# Patient Record
Sex: MALE | URBAN - METROPOLITAN AREA
[De-identification: names, ages, dates, MRNs, and addresses within clinical notes are randomized per-mention and may not be internally consistent; named-entity substitution may affect disease eponyms.]

---

## 2020-03-07 ENCOUNTER — HOSPITAL ENCOUNTER (EMERGENCY)
Age: 30
Discharge: HOME | End: 2020-03-07
Attending: EMERGENCY MEDICINE

## 2020-03-07 ENCOUNTER — APPOINTMENT (OUTPATIENT)
Dept: GENERAL RADIOLOGY | Age: 30
End: 2020-03-07
Attending: EMERGENCY MEDICINE

## 2020-03-07 VITALS
BODY MASS INDEX: 38.32 KG/M2 | DIASTOLIC BLOOD PRESSURE: 87 MMHG | OXYGEN SATURATION: 100 % | HEART RATE: 53 BPM | WEIGHT: 230 LBS | TEMPERATURE: 97 F | HEIGHT: 65 IN | SYSTOLIC BLOOD PRESSURE: 141 MMHG | RESPIRATION RATE: 14 BRPM

## 2020-03-07 DIAGNOSIS — S61.412A LACERATION OF LEFT HAND WITHOUT FOREIGN BODY, INITIAL ENCOUNTER: ICD-10-CM

## 2020-03-07 DIAGNOSIS — S61.412A LACERATION OF LEFT HAND, FOREIGN BODY PRESENCE UNSPECIFIED, INITIAL ENCOUNTER: Primary | ICD-10-CM

## 2020-03-07 PROCEDURE — 99284 EMERGENCY DEPT VISIT MOD MDM: CPT | Performed by: EMERGENCY MEDICINE

## 2020-03-07 PROCEDURE — 73130 X-RAY EXAM OF HAND: CPT

## 2020-03-07 PROCEDURE — 10002800 HB RX 250 W HCPCS: Performed by: EMERGENCY MEDICINE

## 2020-03-07 PROCEDURE — 12001 RPR S/N/AX/GEN/TRNK 2.5CM/<: CPT | Performed by: EMERGENCY MEDICINE

## 2020-03-07 PROCEDURE — 99283 EMERGENCY DEPT VISIT LOW MDM: CPT

## 2020-03-07 PROCEDURE — 96374 THER/PROPH/DIAG INJ IV PUSH: CPT

## 2020-03-07 PROCEDURE — 12001 RPR S/N/AX/GEN/TRNK 2.5CM/<: CPT

## 2020-03-07 RX ORDER — CEPHALEXIN 500 MG/1
500 CAPSULE ORAL 3 TIMES DAILY
Qty: 15 CAPSULE | Refills: 0 | Status: SHIPPED | OUTPATIENT
Start: 2020-03-07 | End: 2020-03-12

## 2020-03-07 RX ADMIN — CEFAZOLIN SODIUM 2000 MG: 300 INJECTION, POWDER, LYOPHILIZED, FOR SOLUTION INTRAVENOUS at 19:33

## 2020-03-07 ASSESSMENT — PAIN SCALES - GENERAL: PAINLEVEL_OUTOF10: 0

## 2024-09-15 ENCOUNTER — APPOINTMENT (OUTPATIENT)
Dept: GENERAL RADIOLOGY | Facility: HOSPITAL | Age: 34
End: 2024-09-15
Attending: EMERGENCY MEDICINE
Payer: COMMERCIAL

## 2024-09-15 ENCOUNTER — HOSPITAL ENCOUNTER (EMERGENCY)
Facility: HOSPITAL | Age: 34
Discharge: HOME | End: 2024-09-16
Attending: EMERGENCY MEDICINE
Payer: COMMERCIAL

## 2024-09-15 VITALS
HEART RATE: 104 BPM | TEMPERATURE: 98 F | WEIGHT: 240 LBS | OXYGEN SATURATION: 97 % | BODY MASS INDEX: 37.67 KG/M2 | DIASTOLIC BLOOD PRESSURE: 77 MMHG | RESPIRATION RATE: 20 BRPM | HEIGHT: 67 IN | SYSTOLIC BLOOD PRESSURE: 132 MMHG

## 2024-09-15 DIAGNOSIS — S61.214A LACERATION OF RIGHT RING FINGER WITHOUT FOREIGN BODY WITHOUT DAMAGE TO NAIL, INITIAL ENCOUNTER: Primary | ICD-10-CM

## 2024-09-15 PROCEDURE — 12001 RPR S/N/AX/GEN/TRNK 2.5CM/<: CPT

## 2024-09-15 PROCEDURE — 99283 EMERGENCY DEPT VISIT LOW MDM: CPT

## 2024-09-15 PROCEDURE — 73140 X-RAY EXAM OF FINGER(S): CPT | Performed by: EMERGENCY MEDICINE

## 2024-09-15 RX ORDER — LIDOCAINE HCL/EPINEPHRINE/PF 2%-1:200K
20 VIAL (ML) INJECTION ONCE
Status: COMPLETED | OUTPATIENT
Start: 2024-09-15 | End: 2024-09-15

## 2024-09-16 NOTE — DISCHARGE INSTRUCTIONS
Keep your wound clean and dry.  You can get it wet after 24 hours, let soap and water run over your wound, do not scrub.    No pools, hot tubs, baths, water tran until your stitches come out.    Use bacitracin on your wound 1-2 times a day.    Stitches come out in 7-10 days.      See hand surgery for a follow up appointment in 2-3 days.    Watch for signs of infection including fever, vomiting, redness or swelling or drainage from your wound.  If you notice this call your doctor or return to the emergency department.

## 2024-09-16 NOTE — ED INITIAL ASSESSMENT (HPI)
Patient arrives via EMS with reports of cleaning a picture frame and it shattered. Laceration noted to right hand (ring finger). Endorses ETOH tonight as well. Per patient, UTD with tetanus.     Wound irrigated by ERT on arrival. Bleeding persistent. Tourniquet and pressure applied pending MD evaluation.

## 2024-09-16 NOTE — ED PROVIDER NOTES
Patient Seen in: [facility] Emergency Department      History     Chief Complaint   Patient presents with    Laceration     Stated Complaint: finger lac    Subjective:   HPI    34-year-old male presents via EMS for evaluation of finger laceration.  Patient reports he was cleaning up a broken picture frame when he cut the ulnar aspect of his right ring finger.  Was unable to stop bleeding with pressure.  No other injuries.  Thinks tetanus was in last 5-10 years, not on anticoagulation.    Objective:   History reviewed. No pertinent past medical history.           History reviewed. No pertinent surgical history.             Social History     Socioeconomic History    Marital status:    Tobacco Use    Smoking status: Every Day     Types: Cigarettes    Smokeless tobacco: Never   Substance and Sexual Activity    Alcohol use: Yes     Comment: socially    Drug use: Yes     Types: Cannabis              Review of Systems    Positive for stated Chief Complaint: Laceration    Other systems are as noted in HPI.  Constitutional and vital signs reviewed.      All other systems reviewed and negative except as noted above.    Physical Exam     ED Triage Vitals [09/15/24 2249]   BP (!) 145/95   Pulse 109   Resp 22   Temp 97.8 °F (36.6 °C)   Temp src Temporal   SpO2 97 %   O2 Device None (Room air)       Current Vitals:   Vital Signs  BP: 132/77  Pulse: 104  Resp: 20  Temp: 97.8 °F (36.6 °C)  Temp src: Temporal  MAP (mmHg): 94    Oxygen Therapy  SpO2: 97 %  O2 Device: None (Room air)            Physical Exam  Vitals and nursing note reviewed.   Constitutional:       General: He is not in acute distress.     Appearance: Normal appearance. He is not toxic-appearing.   HENT:      Head: Normocephalic and atraumatic.   Eyes:      Conjunctiva/sclera: Conjunctivae normal.   Cardiovascular:      Rate and Rhythm: Normal rate and regular rhythm.      Pulses:           Radial pulses are 2+ on the right side and 2+ on the left  side.   Pulmonary:      Effort: Pulmonary effort is normal. No respiratory distress.   Musculoskeletal:         General: Normal range of motion.      Cervical back: Normal range of motion and neck supple. No rigidity.   Skin:     Comments: 2 cm C-shaped laceration at ulnar aspect of lateral right fourth finger at and proximal to PIP   Neurological:      General: No focal deficit present.      Mental Status: He is alert.   Psychiatric:         Mood and Affect: Mood normal.               ED Course   Labs Reviewed - No data to display          Laceration repair:    Verbal consent was obtained from the patient.  The 2 cm laceration located right ring finger was anesthetized in the usual fashion. The wound was scrubbed, draped and explored.   There were no deep structures involved.  No tendon injury was identified.  The wound was repaired with 1 internal suture with 4-0 Chromic Gut, 9 interrupted sutures with 4-0 nylon.   The procedure was performed by myself.      XR  RIGHT FINGERS 3 VW            IMPRESSION:    No acute fracture or malalignment.  Soft tissue swelling over the proximal phalanx of the fourth digit.  No foreign body.       MDM      Medical Decision Making  Bleeding difficult to control with pressure alone, hemostasis achieved with tourniquet, let placed and infiltrated with lidocaine with epinephrine, sutured as above.  Tourniquet removed, noted to have slight oozing which then improved well and served in the emergency department, no longer with bleeding, no evidence of swelling or hematoma.  Placed in a splint in order to facilitate wound healing.  Discussed recommendation for follow-up with hand surgery for further evaluation given possibility of vascular or nerve injury.  Tetanus up-to-date, patient reports clean picture frame, do not feel empiric antibiotics are necessary, discussed this with the patient.  Discharged with above instructions and will return precautions.  Patient verbalizes  understanding of and agreement with this plan.    Problems Addressed:  Laceration of right ring finger without foreign body without damage to nail, initial encounter: acute illness or injury        Disposition and Plan     Clinical Impression:  1. Laceration of right ring finger without foreign body without damage to nail, initial encounter         Disposition:  Discharge  9/16/2024 12:17 am    Follow-up:  Zachery Nava MD  [address] [address]  [phone]    Schedule an appointment as soon as possible for a visit in 3 day(s)  For follow up          Medications Prescribed:  There are no discharge medications for this patient.

## 2024-09-25 ENCOUNTER — HOSPITAL ENCOUNTER (EMERGENCY)
Facility: HOSPITAL | Age: 34
Discharge: HOME | End: 2024-09-25
Attending: EMERGENCY MEDICINE
Payer: COMMERCIAL

## 2024-09-25 VITALS
DIASTOLIC BLOOD PRESSURE: 74 MMHG | HEART RATE: 62 BPM | HEIGHT: 67 IN | SYSTOLIC BLOOD PRESSURE: 128 MMHG | WEIGHT: 240 LBS | BODY MASS INDEX: 37.67 KG/M2 | OXYGEN SATURATION: 98 % | TEMPERATURE: 98 F | RESPIRATION RATE: 14 BRPM

## 2024-09-25 DIAGNOSIS — T81.30XA WOUND DEHISCENCE: Primary | ICD-10-CM

## 2024-09-25 PROCEDURE — 99281 EMR DPT VST MAYX REQ PHY/QHP: CPT

## 2024-09-25 RX ORDER — LIDOCAINE HYDROCHLORIDE 10 MG/ML
20 INJECTION, SOLUTION EPIDURAL; INFILTRATION; INTRACAUDAL; PERINEURAL ONCE
Status: COMPLETED | OUTPATIENT
Start: 2024-09-25 | End: 2024-09-25

## 2024-09-25 NOTE — ED PROVIDER NOTES
Patient Seen in: [facility] Emergency Department      History     Chief Complaint   Patient presents with    Sut Stap RingRemoval     Stated Complaint: suture removal    Subjective:   HPI        Objective:   History reviewed. No pertinent past medical history.           History reviewed. No pertinent surgical history.             Social History     Socioeconomic History    Marital status:    Tobacco Use    Smoking status: Every Day     Types: Cigarettes    Smokeless tobacco: Never   Substance and Sexual Activity    Alcohol use: Yes     Comment: socially    Drug use: Yes     Types: Cannabis              Review of Systems    Positive for stated Chief Complaint: Sut Stap RingRemoval    Other systems are as noted in HPI.  Constitutional and vital signs reviewed.      All other systems reviewed and negative except as noted above.    Physical Exam     ED Triage Vitals [09/25/24 1219]   /78   Pulse 81   Resp 16   Temp 98.1 °F (36.7 °C)   Temp src Oral   SpO2 97 %   O2 Device None (Room air)       Current Vitals:   Vital Signs  BP: 128/74  Pulse: 62  Resp: 14  Temp: 98.1 °F (36.7 °C)  Temp src: Oral    Oxygen Therapy  SpO2: 98 %  O2 Device: None (Room air)            Physical Exam          ED Course   Labs Reviewed - No data to display                   MDM      34-year-old male with a history of a right fourth finger laceration on September 15 that was repaired in the ED presents today for suture removal.  Patient states that there has been no redness, drainage, fevers, or increasing pain.  He has been using the splint as instructed.    On exam, vitals normal, well-appearing, finger in question with sutures in place without surrounding induration or purulence or visible dehiscence.    Differential: Encounter for suture removal.  Wound dehiscence. No current evidence of infection    I removed several of the sutures at the bedside, but the wound started to separate.  Therefore, patient was  reanesthetized with a digital block and I replaced 3 simple interrupted sutures with 5-0 Prolene.  Patient instructed to wait another week for being reexamined for potential suture removal.                                   MDM    Disposition and Plan     Clinical Impression:  1. Wound dehiscence         Disposition:  Discharge  9/25/2024  2:00 pm    Follow-up:  the hand surgeon or the ER    Follow up in 1 week(s)  For suture removal          Medications Prescribed:  There are no discharge medications for this patient.

## (undated) NOTE — LETTER
Date & Time: 9/16/2024, 12:17 AM  Patient: Jerrod Buckley  Encounter Provider(s):    Tammie Rebolledo MD       To Whom It May Concern:    Jerrod Buckley was seen and treated in our department on 9/15/2024. He  can return to work on 9/17/2024 with restriction of not using his right hand until seen by hand surgery for further evaluation .    If you have any questions or concerns, please do not hesitate to call.        _____________________________  Physician/AP[facility] Signature